# Patient Record
Sex: MALE | Race: WHITE | HISPANIC OR LATINO | Employment: FULL TIME | URBAN - METROPOLITAN AREA
[De-identification: names, ages, dates, MRNs, and addresses within clinical notes are randomized per-mention and may not be internally consistent; named-entity substitution may affect disease eponyms.]

---

## 2023-08-08 ENCOUNTER — OCCUPATIONAL MEDICINE (OUTPATIENT)
Dept: URGENT CARE | Facility: CLINIC | Age: 28
End: 2023-08-08
Payer: COMMERCIAL

## 2023-08-08 VITALS
HEART RATE: 90 BPM | BODY MASS INDEX: 45.91 KG/M2 | DIASTOLIC BLOOD PRESSURE: 84 MMHG | RESPIRATION RATE: 20 BRPM | TEMPERATURE: 98.5 F | SYSTOLIC BLOOD PRESSURE: 134 MMHG | WEIGHT: 310 LBS | HEIGHT: 69 IN | OXYGEN SATURATION: 96 %
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
## 2023-08-08 DIAGNOSIS — Z02.1 PRE-EMPLOYMENT DRUG SCREENING: ICD-10-CM

## 2023-08-08 LAB
AMP AMPHETAMINE: NORMAL
BREATH ALCOHOL COMMENT: NORMAL
COC COCAINE: NORMAL
INT CON NEG: NORMAL
INT CON POS: NORMAL
MET METHAMPHETAMINES: NORMAL
OPI OPIATES: NORMAL
PCP PHENCYCLIDINE: NORMAL
POC BREATHALIZER: 0 PERCENT (ref 0–0.01)
POC DRUG COMMENT 753798-OCCUPATIONAL HEALTH: NEGATIVE
THC: NORMAL

## 2023-08-08 PROCEDURE — 99203 OFFICE O/P NEW LOW 30 MIN: CPT | Performed by: NURSE PRACTITIONER

## 2023-08-08 PROCEDURE — 3079F DIAST BP 80-89 MM HG: CPT | Performed by: NURSE PRACTITIONER

## 2023-08-08 PROCEDURE — 82075 ASSAY OF BREATH ETHANOL: CPT | Performed by: NURSE PRACTITIONER

## 2023-08-08 PROCEDURE — 80305 DRUG TEST PRSMV DIR OPT OBS: CPT | Performed by: NURSE PRACTITIONER

## 2023-08-08 PROCEDURE — 3075F SYST BP GE 130 - 139MM HG: CPT | Performed by: NURSE PRACTITIONER

## 2023-08-08 PROCEDURE — 1125F AMNT PAIN NOTED PAIN PRSNT: CPT | Performed by: NURSE PRACTITIONER

## 2023-08-08 ASSESSMENT — PAIN SCALES - GENERAL: PAINLEVEL: 2=MINIMAL-SLIGHT

## 2023-08-08 NOTE — LETTER
"EMPLOYEE’S CLAIM FOR COMPENSATION/ REPORT OF INITIAL TREATMENT  FORM C-4  PLEASE TYPE OR PRINT    EMPLOYEE’S CLAIM - PROVIDE ALL INFORMATION REQUESTED   First Name  Blayne Last Name  Jaspal Calderon Birthdate                    1995                Sex  male Claim Number (Insurer’s Use Only)   Home Address  [address] Age  28 y.o. 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 (if applicable)  QUITNDO KRISTY PLACA DE ANTUNEZ CON 2 MARTILLOS    How did this injury or occupational disease occur? (Be specific and answer in detail. Use additional sheet if necessary)  QUINTANDO KRISTY PLACA DE ANTUNEZ CON 2 MARTILLOS   If you believe that you have an occupational disease, when did you first have knowledge of the disability and its relationship to your employment?   Witnesses to the Accident (if applicable)        Nature of Injury or Occupational Disease  Puncture  Part(s) of Body Injured or Affected  Chest, N/A, N/A    I CERTIFY THAT THE ABOVE IS TRUE AND CORRECT TO T HE BEST OF MY KNOWLEDGE AND THAT I HAVE " PROVIDED THIS INFORMATION IN ORDER TO OBTAIN THE BENEFITS OF [place], OR CHAPTER [place]).  I HEREBY AUTHORIZE ANY PHYSICIAN, CHIROPRACTOR, SURGEON, PRACTITIONER OR ANY OTHER PERSON, ANY HOSPITAL, INCLUDING [facility] OR [facility], ANY  MEDICAL SERVICE ORGANIZATION, ANY INSURANCE COMPANY, OR OTHER INSTITUTION OR ORGANIZATION TO RELEASE TO EACH OTHER, ANY MEDICAL OR OTHER INFORMATION, INCLUDING BENEFITS PAID OR PAYABLE, PERTINENT TO THIS INJURY OR DISEASE, EXCEPT INFORMATION RELATIVE TO DIAGNOSIS, TREATMENT AND/OR COUNSELING FOR AIDS, PSYCHOLOGICAL CONDITIONS, ALCOHOL OR CONTROLLED SUBSTANCES, FOR WHICH I MUST GIVE SPECIFIC AUTHORIZATION.  A PHOTOSTAT OF THIS AUTHORIZATION SHALL BE VALID AS THE ORIGINAL.       Date 8/8/2023   [facility] Original or  *Electronic Signature   THIS REPORT MUST BE COMPLETED AND MAILED WITHIN 3 WORKING DAYS OF TREATMENT   Place  [facility]  Name of Facility  [facility]   Date  8/8/2023 Diagnosis and Description of Injury or Occupational Disease  (W45.0XXA) Puncture wound of skin from metal nail Is there evidence that the injured employee was under the influence of alcohol and/or another controlled substance at the time of accident?  ? No ? Yes (if yes, please explain)   Hour  4:44 PM   The encounter diagnosis was Puncture wound of skin from metal nail. No   Treatment  No work restrictions  FV in one week for wound check. Anticipate discharge MMI at that time  Have you advised the patient to remain off work five days or     more?    X-Ray Findings      ? Yes Indicate dates:   From   To      From information given by the employee, together with medical evidence, can        you directly connect this injury or occupational disease as job incurred?  Yes ? No If no, is the injured employee capable of:  ? full duty  Yes ? modified duty      Is additional  "medical care by a physician indicated?  Yes If modified duty, specify any limitations / restrictions      Do you know of any previous injury or disease contributing to this condition or occupational disease?  ? Yes ? No (Explain if yes)                          No   Date  8/8/2023 Print Health Care Provider's  Name  NI Mcdonald I certify that the employer’s copy of  this form was delivered to the employer on:   Address  [address] Insurer’s Use Only     [facility] Zip  [address]    Provider’s Tax ID Number  [id number] Telephone  Dept: [phone]             Health Care Provider’s Original or Electronic Signature  e-SignWHGILBERT LLOYD Degree (MD,DO, DC,PAElayneC,APRN)  APRN      * Complete and attach Release of Information (Form C-4A) when injured employee signs C-4 Form electronically  ORIGINAL - TREATING HEALTHCARE PROVIDER PAGE 2 - INSURER/TPA PAGE 3 - EMPLOYER PAGE 4 - EMPLOYEE             Form C-4 (rev.08/21)           BRIEF DESCRIPTION OF RIGHTS AND BENEFITS  (Pursuant to [place] 616[place].050)    Notice of Injury or Occupational Disease (Incident Report Form C-1): If an injury or occupational disease (OD) arises out of and in the course of employment, you must provide written notice to your employer as soon as practicable, but no later than 7 days after the accident or OD. Your employer shall maintain a sufficient supply of the required forms.    Claim for Compensation (Form C-4): If medical treatment is sought, the form C-4 is available at the place of initial treatment. A completed \"Claim for Compensation\" (Form C-4) must be filed within 90 days after an accident or OD. The treating physician or chiropractor must, within 3 working days after treatment, complete and mail to the employer, the employer's insurer and third-party , the Claim for Compensation.    Medical Treatment: If you require medical treatment for your on-the-job injury or OD, " you may be required to select a physician or chiropractor from a list provided by your workers’ compensation insurer, if it has contracted with an Organization for Managed Care (MCO) or Preferred Provider Organization (PPO) or providers of health care. If your employer has not entered into a contract with an MCO or PPO, you may select a physician or chiropractor from the Panel of Physicians and Chiropractors. Any medical costs related to your industrial injury or OD will be paid by your insurer.    Temporary Total Disability (TTD): If your doctor has certified that you are unable to work for a period of at least 5 consecutive days, or 5 cumulative days in a 20-day period, or places restrictions on you that your employer does not accommodate, you may be entitled to TTD compensation.    Temporary Partial Disability (TPD): If the wage you receive upon reemployment is less than the compensation for TTD to which you are entitled, the insurer may be required to pay you TPD compensation to make up the difference. TPD can only be paid for a maximum of 24 months.    Permanent Partial Disability (PPD): When your medical condition is stable and there is an indication of a PPD as a result of your injury or OD, within 30 days, your insurer must arrange for an evaluation by a rating physician or chiropractor to determine the degree of your PPD. The amount of your PPD award depends on the date of injury, the results of the PPD evaluation, your age and wage.    Permanent Total Disability (PTD): If you are medically certified by a treating physician or chiropractor as permanently and totally disabled and have been granted a PTD status by your insurer, you are entitled to receive monthly benefits not to exceed 66 2/3% of your average monthly wage. The amount of your PTD payments is subject to reduction if you previously received a lump-sum PPD award.    Vocational Rehabilitation Services: You may be eligible for vocational  rehabilitation services if you are unable to return to the job due to a permanent physical impairment or permanent restrictions as a result of your injury or occupational disease.    Transportation and Per Marzena Reimbursement: You may be eligible for travel expenses and per marzena associated with medical treatment.    Reopening: You may be able to reopen your claim if your condition worsens after claim closure.     Appeal Process: If you disagree with a written determination issued by the insurer or the insurer does not respond to your request, you may appeal to the Department of Administration, , by following the instructions contained in your determination letter. You must appeal the determination within 70 days from the date of the determination letter at [address], [address], [place], [place] [address], or [address], [place], [place] [address]. If you disagree with the  decision, you may appeal to the Department of Administration, . You must file your appeal within 30 days from the date of the  decision letter at [address], [address], [place], [place] [address], or [address], [place], [place] [address]. If you disagree with a decision of an , you may file a petition for judicial review with the District Court. You must do so within 30 days of the Appeal Officer’s decision. You may be represented by an  at your own expense or you may contact the [facility] for possible representation.    [place]  for Injured Workers (NAIW): If you disagree with a  decision, you may request that NAIW represent you without charge at an  Hearing. For information regarding denial of benefits, you may contact the [facility] at: [address], [address], [place] [address], [phone], or [address] [facility] [address] [address], [phone]  [phone]    To File a Complaint with the Division: If you wish to file a complaint with the  of the Division of Industrial Relations (DIR),  please contact the Workers’ Compensation Section, [address], [address], [place], [place] [address], telephone [phone], or [address], [address], [place], [place] [address], telephone [phone].    For assistance with Workers’ Compensation Issues: You may contact the [facility] for Consumer Health Assistance, [address], [place], [place] [address], Toll Free 1-589.221.8167, Web site: http://[facility].[place].gov/Programs/SAMRA E-mail: samra@[facility].[place].gov              __________________________________________________________________                                    _________________            Employee Name / Signature                                                                                                                            Date                                                                                                                                                                                                                              D-2 (rev. 10/20)

## 2023-08-08 NOTE — LETTER
[facility] [address] [address] [facility] [place] [address]  Phone:  [phone] - Fax:  [phone]   [facility] Progress Report and Disability Certification  Date of Service: 8/8/2023   No Show:  No  Date / Time of Next Visit: 8/15/2023   Claim Information   Patient Name: Blayne Calderon  Claim Number:     Employer: [employer]  Date of Injury: 8/8/2023     Insurer / TPA:[employer] INS ID / SSN:     Occupation: STAGIADOR  Diagnosis: The encounter diagnosis was Puncture wound of skin from metal nail.    Medical Information   Related to Industrial Injury? Yes    Subjective Complaints:  DOI: 8/8/23- pt reports he was trying to separate a metal plate from a wood beam with a hammer when a metal splinter hit him in his right arm and his chest. He states the metal splinter was about 1 cm long punctured him in his right pectoral region just above his nipple. Superficial scrape to right upper arm. There was some bleeding on his chest, but that has since stopped. A scab is now present. His tetanus is UTD. Denies a second job.    Objective Findings: Right upper arm- superficial abrasion. Skin intact. No bleeding.  Right chest wall- very small puncture wound just above nipple midline. Scab present. No active bleeding. Mildly TTP. No erythema. No retained FB.   Pre-Existing Condition(s):     Assessment:   Initial Visit    Status: Additional Care Required  Permanent Disability:No    Plan:      Diagnostics:      Comments:  No need for work restrictions  FV in one week to check wound, anticipate discharge MMI at that time    Disability Information   Status: Released to Full Duty    From:  8/8/2023  Through: 8/15/2023 Restrictions are:     Physical Restrictions   Sitting:    Standing:    Stooping:    Bending:      Squatting:    Walking:    Climbing:    Pushing:      Pulling:    Other:    Reaching Above Shoulder (L):   Reaching Above Shoulder (R):        Reaching Below Shoulder (L):    Reaching Below Shoulder (R):      Not to exceed Weight Limits   Carrying(hrs):   Weight Limit(lb):   Lifting(hrs):   Weight  Limit(lb):     Comments:      Repetitive Actions   Hands: i.e. Fine Manipulations from Grasping:     Feet: i.e. Operating Foot Controls:     Driving / Operate Machinery:     Health Care Provider’s Original or Electronic Signature  NI Mcdonald Health Care Provider’s Original or Electronic Signature    Savage Yoon DO MPH     Clinic Name / Location: [address]  [place] [address] Clinic Phone Number: Dept: [phone]   Appointment Time: 4:30 Pm Visit Start Time: 4:44 PM   Check-In Time:  4:36 Pm Visit Discharge Time:  5:24 PM   Original-Treating Physician or Chiropractor    Page 2-Insurer/TPA    Page 3-Employer    Page 4-Employee

## 2023-08-09 NOTE — PROGRESS NOTES
"Subjective     Blayne Calderon is a 28 y.o. male who presents with Injury (DOI: 8/08/23, was hit on Ret side of chest with a piece of metal and started bleeding,tender to the touch, Rt arm scraped: had another under shirt, last TDAP was 20 Months ago in [place]) and Drug / Alcohol Assessment (Post accident BAT/ Instant 6 panel, [employer] 8/8/23)      DOI: 8/8/23- pt reports he was trying to separate a metal plate from a wood beam with a hammer when a metal splinter hit him in his right arm and his chest. He states the metal splinter was about 1 cm long punctured him in his right pectoral region just above his nipple. Superficial scrape to right upper arm. There was some bleeding on his chest, but that has since stopped. A scab is now present. His tetanus is UTD. Denies a second job.      HPI    Review of Systems   Skin:         Puncture to right chest wall   All other systems reviewed and are negative.             Objective     /84 (BP Location: Left arm, Patient Position: Sitting, BP Cuff Size: Adult long)   Pulse 90   Temp 36.9 °C (98.5 °F) (Temporal)   Resp 20   Ht 1.753 m (5' 9\")   Wt (!) 141 kg (310 lb)   SpO2 96%   BMI 45.78 kg/m²      Physical Exam  Vitals and nursing note reviewed.   Constitutional:       Appearance: Normal appearance.   HENT:      Head: Normocephalic and atraumatic.      Nose: Nose normal.      Mouth/Throat:      Mouth: Mucous membranes are moist.      Pharynx: Oropharynx is clear.   Eyes:      Extraocular Movements: Extraocular movements intact.      Pupils: Pupils are equal, round, and reactive to light.   Cardiovascular:      Rate and Rhythm: Normal rate and regular rhythm.   Pulmonary:      Effort: Pulmonary effort is normal.   Musculoskeletal:         General: Normal range of motion.      Cervical back: Normal range of motion and neck supple.   Skin:     General: Skin is warm and dry.      Capillary Refill: Capillary refill takes less than 2 seconds. "   Neurological:      General: No focal deficit present.      Mental Status: He is alert and oriented to person, place, and time. Mental status is at baseline.   Psychiatric:         Mood and Affect: Mood normal.         Thought Content: Thought content normal.         Judgment: Judgment normal.         Right upper arm- superficial abrasion. Skin intact. No bleeding.  Right chest wall- very small puncture wound just above nipple midline. Scab present. No active bleeding. Mildly TTP. No erythema. No retained FB.                   Assessment & Plan        1. Puncture wound of skin from metal nail  - POCT Breath Alcohol Test  - POCT 6 Panel Urine Drug Screen    2. Pre-employment drug screening      No need for work restrictions  FV in one week to check wound, anticipate discharge MMI at that time

## 2023-08-15 ENCOUNTER — OCCUPATIONAL MEDICINE (OUTPATIENT)
Dept: URGENT CARE | Facility: CLINIC | Age: 28
End: 2023-08-15
Payer: COMMERCIAL

## 2023-08-15 VITALS
BODY MASS INDEX: 48.65 KG/M2 | TEMPERATURE: 97.7 F | RESPIRATION RATE: 18 BRPM | HEIGHT: 67 IN | WEIGHT: 310 LBS | DIASTOLIC BLOOD PRESSURE: 88 MMHG | HEART RATE: 83 BPM | SYSTOLIC BLOOD PRESSURE: 124 MMHG | OXYGEN SATURATION: 96 %
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
## 2023-08-15 PROCEDURE — 99213 OFFICE O/P EST LOW 20 MIN: CPT | Performed by: PHYSICIAN ASSISTANT

## 2023-08-15 PROCEDURE — 3079F DIAST BP 80-89 MM HG: CPT | Performed by: PHYSICIAN ASSISTANT

## 2023-08-15 PROCEDURE — 3074F SYST BP LT 130 MM HG: CPT | Performed by: PHYSICIAN ASSISTANT

## 2023-08-15 NOTE — LETTER
[facility] [address] - [place] [address]  Phone:  [phone] - Fax:  [phone]   Occ[facility] Progress Report and Disability Certification  Date of Service: 8/15/2023   No Show:  No  Date / Time of Next Visit:     Claim Information   Patient Name: Blayne Calderon  Claim Number:     Employer: [employer]  Date of Injury: 8/8/2023     Insurer / TPA: [employer]  ID / SSN:     Occupation: STAGIADOR  Diagnosis: The encounter diagnosis was Puncture wound of skin from metal nail.    Medical Information   Related to Industrial Injury?      Subjective Complaints:  Employer's Name:  [employer]    Patient presents with:  Follow-Up: W/C DOI: 08/08/23 chest area .  PT states healing well without pain, redness or discharge. No signs of infection.  PT has been working without any issues.  PT ready for discharge.       Date of Injury:  8/8/2023    Mechanism of Injury:  QUINTANDO KRISTY PLACA DE ANTUNEZ CON 2 MARTILLOS  QUITNDO KRISTY PLACA DE ANTUNEZ CON 2 MARTILLOS               Location of Injury:  Puncture, Chest  N/A                  A qualified  was used to interpret [place]  during this encounter.  's name/ID number was [email or web address]/ [id number] [employer] and mode of interpretation was iPad  .       Objective Findings: Skin Exam: Tiny scab to right anterior chest/pectoral area , superior to nipple.  No surrounding erythema, swelling or discharge.  Non tender to palpation. FROM of RUE.    Pre-Existing Condition(s):     Assessment:   Condition Improved    Status: Discharged /  MMI  Permanent Disability:     Plan:      Diagnostics:      Comments:       Disability Information   Status:      From:     Through:   Restrictions are:     Physical Restrictions   Sitting:    Standing:    Stooping:    Bending:      Squatting:    Walking:    Climbing:    Pushing:      Pulling:    Other:    Reaching  Above Shoulder (L):   Reaching Above Shoulder (R):       Reaching Below Shoulder (L):    Reaching Below Shoulder (R):      Not to exceed Weight Limits   Carrying(hrs):   Weight Limit(lb):   Lifting(hrs):   Weight  Limit(lb):     Comments: PT has reached MMI and has been discharged to return to work without restrictions.       Repetitive Actions   Hands: i.e. Fine Manipulations from Grasping:     Feet: i.e. Operating Foot Controls:     Driving / Operate Machinery:     Health Care Provider’s Original or Electronic Signature  Nessa Levin P.A.-C. Health Care Provider’s Original or Electronic Signature    Savage Yoon DO MPH     Clinic Name / Location: [address]  [place] [address] Clinic Phone Number: Dept: [phone]   Appointment Time: 4:30 Pm Visit Start Time: 4:55 PM   Check-In Time:  4:32 Pm Visit Discharge Time:  5:27 PM   Original-Treating Physician or Chiropractor    Page 2-Insurer/TPA    Page 3-Employer    Page 4-Employee

## 2023-08-21 NOTE — PROGRESS NOTES
"Subjective     Blayne Calderon is a 28 y.o. male who presents with Follow-Up (W/C DOI: 08/08/23 chest area )      Employer's Name:  [employer]    Patient presents with:  Follow-Up: W/C DOI: 08/08/23 chest area .  PT states healing well without pain, redness or discharge. No signs of infection.  PT has been working without any issues.  PT ready for discharge.       Date of Injury:  8/8/2023    Mechanism of Injury:  QUINTANDO KRISTY PLACA DE ANTUNEZ CON 2 MARTILLOS  QUITNDO KRISTY PLACA DE ANTUNEZ CON 2 MARTILLOS               Location of Injury:  Puncture, Chest  N/A                  A qualified  was used to interpret Romanian  during this encounter.  's name/ID number was [email or web address]/ [id number] ipad and mode of interpretation was iPad  .         Employer's Name:  [employer]    Patient presents with:  Follow-Up: W/C DOI: 08/08/23 chest area.  PT states healing well without pain, redness or discharge. No signs of infection.  PT has been working without any issues.  PT ready for discharge.       A qualified  was used to interpret Romanian  during this encounter.  's name/ID number was [email or web address]/ [id number] ipad and mode of interpretation was iPad  .                            Review of Systems   All other systems reviewed and are negative.             Objective     /88   Pulse 83   Temp 36.5 °C (97.7 °F) (Temporal)   Resp 18   Ht 1.702 m (5' 7\")   Wt (!) 141 kg (310 lb)   SpO2 96%   BMI 48.55 kg/m²      Physical Exam    Skin Exam: Tiny scab to right anterior chest/pectoral area , superior to nipple.  No surrounding erythema, swelling or discharge.  Non tender to palpation. FROM of RUE.                    Assessment & Plan        1. Puncture wound of skin from metal nail            PT has reached MMI and has been discharged to return to work without restrictions.     If pt experiences any discomfort, he can take otc nsaids as needed until " resolved.     Differential diagnosis, supportive care, and indications for immediate follow-up discussed with patient.  Instructed to return to clinic or nearest emergency department for any change in condition, further concerns, or worsening of symptoms.    I personally reviewed prior external notes and test results pertinent to today's visit.  I have independently reviewed and interpreted all diagnostics ordered during this urgent care visit.    PT should follow up with PCP in 1-2 days for re-evaluation if symptoms have not improved.      Discussed red flags and reasons to return to UC or ED.      Pt and/or family verbalized understanding of diagnosis and follow up instructions and was offered informational handout on diagnosis.  PT discharged.     Please note that this dictation was created using voice recognition software. I have made every reasonable attempt to correct obvious errors, but I expect that there may be errors of grammar and possibly content that I did not discover before finalizing the note.